# Patient Record
Sex: FEMALE | Race: ASIAN | NOT HISPANIC OR LATINO
[De-identification: names, ages, dates, MRNs, and addresses within clinical notes are randomized per-mention and may not be internally consistent; named-entity substitution may affect disease eponyms.]

---

## 2022-04-01 ENCOUNTER — APPOINTMENT (OUTPATIENT)
Dept: UROLOGY | Facility: CLINIC | Age: 37
End: 2022-04-01
Payer: MEDICAID

## 2022-04-01 VITALS
BODY MASS INDEX: 23.89 KG/M2 | SYSTOLIC BLOOD PRESSURE: 102 MMHG | WEIGHT: 154 LBS | RESPIRATION RATE: 18 BRPM | HEART RATE: 78 BPM | HEIGHT: 67.32 IN | OXYGEN SATURATION: 96 % | DIASTOLIC BLOOD PRESSURE: 71 MMHG | TEMPERATURE: 98.1 F

## 2022-04-01 DIAGNOSIS — Z00.00 ENCOUNTER FOR GENERAL ADULT MEDICAL EXAMINATION W/OUT ABNORMAL FINDINGS: ICD-10-CM

## 2022-04-01 DIAGNOSIS — Z78.9 OTHER SPECIFIED HEALTH STATUS: ICD-10-CM

## 2022-04-01 DIAGNOSIS — R31.21 ASYMPTOMATIC MICROSCOPIC HEMATURIA: ICD-10-CM

## 2022-04-01 PROCEDURE — 76775 US EXAM ABDO BACK WALL LIM: CPT

## 2022-04-01 PROCEDURE — 52000 CYSTOURETHROSCOPY: CPT

## 2022-04-01 PROCEDURE — 99204 OFFICE O/P NEW MOD 45 MIN: CPT | Mod: 25

## 2022-04-01 PROCEDURE — 99203 OFFICE O/P NEW LOW 30 MIN: CPT | Mod: 25

## 2022-04-01 NOTE — LETTER BODY
[FreeTextEntry1] : Reema Florence MD\par [address] [facility], [address], \[place] [address]\par [phone]\par \par Dear Dr. Florence, \par \par Reason for visit: Microscopic hematuria. \par \par This is a 36 year-old Mandarin-speaking woman with microscopic hematuria referred for evaluation. Her urinalysis demonstrated evidence of microscopic hematuria. She denies any gross hematuria, dysuria or urinary incontinence. The patient denies any aggravating or relieving factors. The patient denies any interference of function. The patient is entirely asymptomatic. All other review of systems are negative. She has no cancer in her family medical history. She has no previous surgical history. Past medical history, family history and social history were inquired and were noncontributory to current condition. The patient does not use tobacco or drink alcohol. Medications and allergies were reviewed. She has no known allergies to medication. \par \par On examination, the patient is a healthy-appearing woman in no acute distress. She is alert and oriented follows commands. She  has normal mood and affect. She is normocephalic. Neck is supple. Oral no thrush. Respirations are unlabored. Abdomen is soft and nontender. Bladder is nonpalpable. No CVA tenderness. Neurologically she is grossly intact. No peripheral edema. Skin without gross abnormality.\par \par Her urinalysis demonstrated evidence of microscopic hematuria.\par \par Assessment: Microscopic hematuria.\par \par I counseled the patient on the various etiologies of the microscopic hematuria. I discussed the risk of occult malignancy. I recommended patient obtain urinalysis and urine cytology. I also recommended the patient undergo cystoscopy and renal ultrasound today to evaluate for hematuria.  I counseled the patient about the procedures. I answered the patient's questions. The risks and expected outcomes were discussed. The patient will follow up as directed and contact me with any questions or concerns. Thank you for the opportunity to participate in the care of Ms. ONEILL. I will keep you updated on her progress. \par \par Plan: Cystoscopy. Renal ultrasound. Urinalysis. Urine cytology. Follow up in 6 months. \par \par Her cystoscopy today was unremarkable. \par \par I personally reviewed ultrasound images with the patient today and images demonstrated both kidneys appear normal in size and echogenicity. No stones, solid masses or hydronephrosis visualized.

## 2022-04-01 NOTE — ADDENDUM
[FreeTextEntry1] : Entered by Brennen Pearson, acting as scribe for Dr. Toni Shearer.\par \par The documentation recorded by the scribe accurately reflects the service I personally performed and the decisions made by me.

## 2022-04-02 LAB
APPEARANCE: CLEAR
BACTERIA: ABNORMAL
BILIRUBIN URINE: NEGATIVE
BLOOD URINE: NEGATIVE
COLOR: NORMAL
GLUCOSE QUALITATIVE U: NEGATIVE
HYALINE CASTS: 2 /LPF
KETONES URINE: NEGATIVE
LEUKOCYTE ESTERASE URINE: ABNORMAL
MICROSCOPIC-UA: NORMAL
NITRITE URINE: NEGATIVE
PH URINE: 8
PROTEIN URINE: NEGATIVE
RED BLOOD CELLS URINE: 2 /HPF
SPECIFIC GRAVITY URINE: 1.01
SQUAMOUS EPITHELIAL CELLS: 3 /HPF
UROBILINOGEN URINE: NORMAL
WHITE BLOOD CELLS URINE: 1 /HPF

## 2022-04-05 LAB — URINE CYTOLOGY: NORMAL
